# Patient Record
Sex: FEMALE
[De-identification: names, ages, dates, MRNs, and addresses within clinical notes are randomized per-mention and may not be internally consistent; named-entity substitution may affect disease eponyms.]

---

## 2019-10-01 ENCOUNTER — HOSPITAL ENCOUNTER (INPATIENT)
Dept: HOSPITAL 56 - MW.OB | Age: 29
LOS: 2 days | Discharge: HOME | End: 2019-10-03
Attending: OBSTETRICS & GYNECOLOGY | Admitting: OBSTETRICS & GYNECOLOGY
Payer: COMMERCIAL

## 2019-10-01 DIAGNOSIS — Z3A.38: ICD-10-CM

## 2019-10-01 DIAGNOSIS — O41.03X0: Primary | ICD-10-CM

## 2019-10-01 RX ADMIN — MISOPROSTOL PRN MCG: 100 TABLET ORAL at 21:00

## 2019-10-02 PROCEDURE — 3E0P7VZ INTRODUCTION OF HORMONE INTO FEMALE REPRODUCTIVE, VIA NATURAL OR ARTIFICIAL OPENING: ICD-10-PCS | Performed by: OBSTETRICS & GYNECOLOGY

## 2019-10-02 PROCEDURE — 3E0R3BZ INTRODUCTION OF ANESTHETIC AGENT INTO SPINAL CANAL, PERCUTANEOUS APPROACH: ICD-10-PCS | Performed by: OBSTETRICS & GYNECOLOGY

## 2019-10-02 PROCEDURE — 10H07YZ INSERTION OF OTHER DEVICE INTO PRODUCTS OF CONCEPTION, VIA NATURAL OR ARTIFICIAL OPENING: ICD-10-PCS | Performed by: OBSTETRICS & GYNECOLOGY

## 2019-10-02 PROCEDURE — 00HU33Z INSERTION OF INFUSION DEVICE INTO SPINAL CANAL, PERCUTANEOUS APPROACH: ICD-10-PCS | Performed by: OBSTETRICS & GYNECOLOGY

## 2019-10-02 PROCEDURE — 3E033VJ INTRODUCTION OF OTHER HORMONE INTO PERIPHERAL VEIN, PERCUTANEOUS APPROACH: ICD-10-PCS | Performed by: OBSTETRICS & GYNECOLOGY

## 2019-10-02 PROCEDURE — 4A1HXCZ MONITORING OF PRODUCTS OF CONCEPTION, CARDIAC RATE, EXTERNAL APPROACH: ICD-10-PCS | Performed by: OBSTETRICS & GYNECOLOGY

## 2019-10-02 RX ADMIN — MISOPROSTOL PRN MCG: 100 TABLET ORAL at 01:00

## 2019-10-02 NOTE — PCM.PRNOTE
- Free Text/Narrative


Note: 





Requested for Labor Epidural.  , ~3cm in active labor.  pain 7/10. Discussed

, ? answered, wished to proceed.





Position: Sitting


Prep: Chloroprep


Interspace: L3-L4


Skin Local: Lidocaine 1% 5ml


Space: Id'd via JONEL technique with saline/air mixture.  Reconfirmed with saline.


Catheter: 8cm depth without issues.


Test: 5ml 1.5% lidocaine with 1:200K epi added.  -ASP/Test NEGATIVE. 07:05


Bolus @ 07:11-16 with 0.2% Naropin with 100mcg Fentanyl added.  VSS


07:28 Pain 1/10.  07:33 Gtt started 8ml/hr with 4ml q15 bolus.


Patient stable.  No problems noted at present.  Tolerated well.

## 2019-10-02 NOTE — PCM.DEL
L & D Note





- General Info


Date of Service: 10/02/19


Mother's Due Date: 10/15/19





- Delivery Note


Labor: Augmented by ARM, Augmented by Oxytocin


Cervical Ripening Method: Misoprostil


Delivery Outcome: Livebirth


Birth Presentation: Left Occiput Anterior (RADHA)


Nuchal Cord: None


Anesthesia Type: Epidural


Episiotomy Type: None


Laceration: None


Placenta: Intact


Cord: 3 Vessels


APGAR Score 1 min: 8


APGAR Score 5 min: 9


Delivery Comments (Free Text/Narrative):: 





Live male  delivered at 1020am , apgar 8/9 weight pending 





- General Info


Date of Service: 10/02/19





- Patient Data


Weight - Most Recent: 88.904 kg


Lab Results Last 24 Hours: 


 Laboratory Results - last 24 hr











  10/01/19 10/01/19 Range/Units





  16:25 16:25 


 


WBC  6.51   (4.0-11.0)  K/uL


 


RBC  3.91 L   (4.30-5.90)  M/uL


 


Hgb  11.6 L   (12.0-16.0)  g/dL


 


Hct  35.3 L   (36.0-46.0)  %


 


MCV  90.3   (80.0-98.0)  fL


 


MCH  29.7   (27.0-32.0)  pg


 


MCHC  32.9   (31.0-37.0)  g/dL


 


RDW Std Deviation  52.1   (28.0-62.0)  fl


 


RDW Coeff of Fabrizio  16 H   (11.0-15.0)  %


 


Plt Count  197   (150-400)  K/uL


 


MPV  12.20 H   (7.40-12.00)  fL


 


Nucleated RBC %  0.0   /100WBC


 


Nucleated RBCs #  0   K/uL


 


Blood Type   A POSITIVE  


 


Antibody Screen   NEGATIVE  











Med Orders - Current: 


 Current Medications





Butorphanol Tartrate (Stadol)  1 mg IVPUSH Q1H PRN


   PRN Reason: Pain


Carboprost Tromethamine (Hemabate Ds)  250 mcg IM ASDIRECTED PRN


   PRN Reason: Post Partum Hemorrhage


Oxytocin/Sodium Chloride (Oxytocin 30 Unit/500 Ml-Ns)  30 unit in 500 mls @ 2 

mls/hr IV TITRATE DEB; Protocol


   Last Titration: 10/02/19 10:02 Dose:  4 munits/min, 4 mls/hr


Lactated Ringer's (Ringers, Lactated)  1,000 mls @ 150 mls/hr IV ASDIRECTED Formerly [facility]


   Last Admin: 10/02/19 10:05 Dose:  150 mls/hr


Oxytocin/Sodium Chloride (Oxytocin 30 Unit/500 Ml-Ns)  30 unit in 500 mls @ 500 

mls/hr IV TITRATE Formerly [facility]


Tranexamic Acid 1,000 mg/ (Sodium Chloride)  110 mls @ 660 mls/hr IV ONETIME PRN


   PRN Reason: Bleeding


Ampicillin Sodium 1 gm/ Sodium (Chloride)  50 mls @ 100 mls/hr IV Q4H Former[facility]


   Last Admin: 10/02/19 09:11 Dose:  100 mls/hr


Lidocaine HCl (Xylocaine 1%)  50 ml INJECT ONETIME PRN


   PRN Reason: Laceration repair


Methylergonovine Maleate (Methergine)  0.2 mg IM ASDIRECTED PRN


   PRN Reason: Post Partum Hemorrhage


Misoprostol (Cytotec)  25 mcg VAG ONETIME PRN


   PRN Reason: Cervical Ripening


   Last Admin: 10/01/19 16:48 Dose:  25 mcg


Misoprostol (Cytotec)  25 mcg VAG Q4H PRN


   PRN Reason: Cervical Ripening


   Last Admin: 10/02/19 01:00 Dose:  25 mcg


Misoprostol (Cytotec)  200 mcg PO ONETIME PRN


   PRN Reason: Post Partum Hemorrhage


Nalbuphine HCl (Nubain)  10 mg IVPUSH Q1H PRN


   PRN Reason: Pain (severe 7-10)


Ondansetron HCl (Zofran)  4 mg IVPUSH Q4H PRN


   PRN Reason: Nausea/Vomiting


   Last Admin: 10/02/19 08:23 Dose:  4 mg


Sodium Chloride (Saline Flush)  10 ml FLUSH ASDIRECTED PRN


   PRN Reason: Keep Vein Open


Sodium Chloride (Saline Flush)  2.5 ml FLUSH ASDIRECTED PRN


   PRN Reason: Keep Vein Open


Sodium Chloride (Normal Saline)  10 ml IV ASDIRECTED PRN


   PRN Reason: IV Use


Sterile Water (Sterile Water For Irrigation)  1,000 ml IRR ASDIRECTED PRN


   PRN Reason: delivery


Terbutaline Sulfate (Brethine)  0.25 mg SUBCUT ASDIRECTED PRN


   PRN Reason: Tacysystole





Discontinued Medications





Fentanyl (Sublimaze) Confirm Administered Dose 100 mcg .ROUTE .STK-MED ONE


   Stop: 10/02/19 06:52


Ampicillin Sodium 2 gm/ Sodium (Chloride)  100 mls @ 200 mls/hr IV ONETIME ONE


   Stop: 10/01/19 16:59


   Last Admin: 10/01/19 16:39 Dose:  200 mls/hr


Ropivacaine (Naropin 0.2%) Confirm Administered Dose 100 mls @ as directed 

.ROUTE .STK-MED ONE


   Stop: 10/02/19 06:52


Ropivacaine (Naropin 0.2%) Confirm Administered Dose 20 ml .ROUTE .STK-MED ONE


   Stop: 10/02/19 06:52











- Problem List & Annotations


(1) Vaginal delivery


SNOMED Code(s): 237597012


   Code(s): O80 - ENCOUNTER FOR FULL-TERM UNCOMPLICATED DELIVERY   Status: 

Acute   Current Visit: Yes   





- Problem List Review


Problem List Initiated/Reviewed/Updated: Yes

## 2019-10-02 NOTE — PCM.PREANE
Preanesthetic Assessment





- Anesthesia/Transfusion/Family Hx


Anesthesia History: No Prior Anesthesia


Type of Anesthesia Reaction: Allergy


Family History of Anesthesia Reaction: Yes


Transfusion History: No Prior Transfusion(s)


Type of Transfusion Reactions: Reports: Anaphylaxis


Intubation History: Unknown





- Review of Systems


General: No Symptoms


Pulmonary: No Symptoms


Cardiovascular: No Symptoms


Gastrointestinal: No Symptoms


Neurological: No Symptoms


Other: Reports: None





- Physical Assessment


NPO Status Date: 10/02/19


NPO Status Time: 07:49 (clear liquids)


Height: 1.57 m


Weight: 88.904 kg


ASA Class: 2


Mental Status: Alert & Oriented x3


Dentition: Reports: Normal Dentition


ROM/Head Extension: Full


Lungs: Clear to Auscultation


Cardiovascular: Regular Rate





- Lab


Values: 





 Laboratory Last Values











WBC  6.51 K/uL (4.0-11.0)   10/01/19  16:25    


 


RBC  3.91 M/uL (4.30-5.90)  L  10/01/19  16:25    


 


Hgb  11.6 g/dL (12.0-16.0)  L  10/01/19  16:25    


 


Hct  35.3 % (36.0-46.0)  L  10/01/19  16:25    


 


MCV  90.3 fL (80.0-98.0)   10/01/19  16:25    


 


MCH  29.7 pg (27.0-32.0)   10/01/19  16:25    


 


MCHC  32.9 g/dL (31.0-37.0)   10/01/19  16:25    


 


RDW Std Deviation  52.1 fl (28.0-62.0)   10/01/19  16:25    


 


RDW Coeff of Fabrizio  16 % (11.0-15.0)  H  10/01/19  16:25    


 


Plt Count  197 K/uL (150-400)   10/01/19  16:25    


 


MPV  12.20 fL (7.40-12.00)  H  10/01/19  16:25    


 


Nucleated RBC %  0.0 /100WBC  10/01/19  16:25    


 


Nucleated RBCs #  0 K/uL  10/01/19  16:25    


 


Blood Type  A POSITIVE   10/01/19  16:25    


 


Antibody Screen  NEGATIVE   10/01/19  16:25    














- Allergies


Allergies/Adverse Reactions: 


 Allergies











Allergy/AdvReac Type Severity Reaction Status Date / Time


 


No Known Allergies Allergy   Verified 10/01/19 15:57














- Blood


Blood Available: No





- Anesthesia Plan


Pre-Op Medication Ordered: None





- Acknowledgements


Anesthesia Type Planned: Epidural


Pt an Appropriate Candidate for the Planned Anesthesia: Yes


Alternatives and Risks of Anesthesia Discussed w Pt/Guardian: Yes


Pt/Guardian Understands and Agrees with Anesthesia Plan: Yes


Additional Comments: 





Discussed, ? answered, permit signed, acceptable candidate.





PreAnesthesia Questionnaire





- Past Health History


Medical/Surgical History: Denies Medical/Surgical History


OB/GYN History: Reports: Pregnancy





- SUBSTANCE USE


Smoking Status *Q: Never Smoker


Recreational Drug Use History: No





- CURRENT (IN HOUSE) MEDS


Current Meds: 





 Current Medications





Butorphanol Tartrate (Stadol)  1 mg IVPUSH Q1H PRN


   PRN Reason: Pain


Carboprost Tromethamine (Hemabate Ds)  250 mcg IM ASDIRECTED PRN


   PRN Reason: Post Partum Hemorrhage


Oxytocin/Sodium Chloride (Oxytocin 30 Unit/500 Ml-Ns)  30 unit in 500 mls @ 2 

mls/hr IV TITRATE DEB; Protocol


   Last Titration: 10/02/19 06:20 Dose:  4 munits/min, 4 mls/hr


Lactated Ringer's (Ringers, Lactated)  1,000 mls @ 150 mls/hr IV ASDIRECTED DEB


   Last Admin: 10/01/19 16:28 Dose:  150 mls/hr


Oxytocin/Sodium Chloride (Oxytocin 30 Unit/500 Ml-Ns)  30 unit in 500 mls @ 500 

mls/hr IV TITRATE DEB


Tranexamic Acid 1,000 mg/ (Sodium Chloride)  110 mls @ 660 mls/hr IV ONETIME PRN


   PRN Reason: Bleeding


Ampicillin Sodium 1 gm/ Sodium (Chloride)  50 mls @ 100 mls/hr IV Q4H DEB


   Last Admin: 10/02/19 05:19 Dose:  100 mls/hr


Lidocaine HCl (Xylocaine 1%)  50 ml INJECT ONETIME PRN


   PRN Reason: Laceration repair


Methylergonovine Maleate (Methergine)  0.2 mg IM ASDIRECTED PRN


   PRN Reason: Post Partum Hemorrhage


Misoprostol (Cytotec)  25 mcg VAG ONETIME PRN


   PRN Reason: Cervical Ripening


   Last Admin: 10/01/19 16:48 Dose:  25 mcg


Misoprostol (Cytotec)  25 mcg VAG Q4H PRN


   PRN Reason: Cervical Ripening


   Last Admin: 10/02/19 01:00 Dose:  25 mcg


Misoprostol (Cytotec)  200 mcg PO ONETIME PRN


   PRN Reason: Post Partum Hemorrhage


Nalbuphine HCl (Nubain)  10 mg IVPUSH Q1H PRN


   PRN Reason: Pain (severe 7-10)


Ondansetron HCl (Zofran)  4 mg IVPUSH Q4H PRN


   PRN Reason: Nausea/Vomiting


Sodium Chloride (Saline Flush)  10 ml FLUSH ASDIRECTED PRN


   PRN Reason: Keep Vein Open


Sodium Chloride (Saline Flush)  2.5 ml FLUSH ASDIRECTED PRN


   PRN Reason: Keep Vein Open


Sodium Chloride (Normal Saline)  10 ml IV ASDIRECTED PRN


   PRN Reason: IV Use


Sterile Water (Sterile Water For Irrigation)  1,000 ml IRR ASDIRECTED PRN


   PRN Reason: delivery


Terbutaline Sulfate (Brethine)  0.25 mg SUBCUT ASDIRECTED PRN


   PRN Reason: Tacysystole





Discontinued Medications





Fentanyl (Sublimaze) Confirm Administered Dose 100 mcg .ROUTE .STK-MED ONE


   Stop: 10/02/19 06:52


Ampicillin Sodium 2 gm/ Sodium (Chloride)  100 mls @ 200 mls/hr IV ONETIME ONE


   Stop: 10/01/19 16:59


   Last Admin: 10/01/19 16:39 Dose:  200 mls/hr


Ropivacaine (Naropin 0.2%) Confirm Administered Dose 100 mls @ as directed 

.ROUTE .STK-MED ONE


   Stop: 10/02/19 06:52


Ropivacaine (Naropin 0.2%) Confirm Administered Dose 20 ml .ROUTE .STK-MED ONE


   Stop: 10/02/19 06:52

## 2019-10-02 NOTE — OR
SURGEON:

JANAY GAMEZ

 

DATE OF PROCEDURE:10/03/2019

 

PREOPERATIVE DIAGNOSIS:

A 29-year-old G3, P 2-0-0-2, at 38 weeks 1 day undergoing induction of labor for

oligohydramnios.

 

POSTOPERATIVE DIAGNOSIS:

A 29-year-old G3, P 2-0-0-2, at 38 weeks 1 day undergoing induction of labor for

oligohydramnios.

 

PROCEDURE:

Normal spontaneous vaginal delivery.

 

ESTIMATED BLOOD LOSS:

100.

 

IV FLUID:

Pitocin running.

 

ANESTHESIA:

Epidural.

 

COMPLICATIONS:

None.

 

NOTES AND FINDINGS:

A live male  delivered at 10:20 a.m.  Apgar score was 8 and 9.  Weight 
is 2850g

A true note was noted  

 

BRIEF HISTORY:

She is a 29-year-old G3, P 2-0-0-2, at 38 weeks who came in for routine prenatal

care.  She had an ultrasound done and it showed oligohydramnios.  SAUL was 4.  As

a result, the patient was brought in for induction of labor.  Induction of

labor, she received three doses of Cytotec after which the Pitocin was started.

AROM was done.  The patient was noted to have some variable decelerations.  IUPC

was placed.  Amnioinfusion was also given.  Tracing was intermittent between

category 2 and category 1.  She made good progress.  She became fully dilated,

and she was encouraged to push.

 

DESCRIPTION OF PROCEDURE:

With the patient being fully dilated, she was to deliver the head, subsequently

by the anterior and posterior shoulder.  The body of the infant was delivered.

A true cord was noted around the umbilical cord.  The cord was clamped and cut

after a delay  for about 60 seconds.  The cord blood gases were obtained.  
Placenta

was delivered with controlled cord traction.  The cervix was swept, noted to be

intact.  Uterus was firm.  The vaginal bleeding was normal.  Normal lochia

 noted.  All instrument and pad count were correct x2.  Perineum was

inspected and noted to be intact.  The patient was left in Labor and Delivery

room in stable condition.

 

 

TIM MARAVILLA

DD:  10/02/2019 10:40:23

DT:  10/02/2019 14:24:10

Job #:  [id number]

HARRY

## 2019-10-02 NOTE — [id number]
Post Anesthesia Note





- EVALUATION WITHIN 48HRS OF ANESTHETIC


Vital Signs in Normal Range: Yes


Patient Participated in Evaluation: Yes


Respiratory Function Stable: Yes


Airway Patent: Yes


Cardiovascular Function Stable: Yes


Hydration Status Stable: Yes


Pain Control Satisfactory: Yes


Nausea and Vomiting Control Satisfactory: Yes


Mental Status Recovered: Yes


Vital Signs: 





Stable





- COMMENTS/OBSERVATIONS


Free Text/Narrative:: 





Delivered without issues.  Doing well.  No problems noted post.

## 2019-10-03 NOTE — PCM.PNPP
- General Info


Date of Service: 10/03/19


Subjective Update: 





breastfeeding well, minimal lochia.  Tolerating diet, would like to go home 

today.


Functional Status: Reports: Pain Controlled, Tolerating Diet, Ambulating, 

Urinating





- Review of Systems


General: Reports: No Symptoms


HEENT: Reports: No Symptoms


Pulmonary: Reports: No Symptoms


Cardiovascular: Reports: No Symptoms


Gastrointestinal: Reports: No Symptoms


Genitourinary: Reports: No Symptoms


Musculoskeletal: Reports: No Symptoms


Skin: Reports: No Symptoms


Neurological: Reports: No Symptoms


Psychiatric: Reports: No Symptoms





- Patient Data


Vital Signs - Most Recent: 


 Last Vital Signs











Temp  37.0 C   10/03/19 07:00


 


Pulse  74   10/03/19 07:00


 


Resp  16   10/03/19 07:00


 


BP  152/81 H  10/03/19 07:00


 


Pulse Ox  94 L  10/03/19 07:00











Weight - Most Recent: 88.904 kg


Lab Results - Last 24 Hours: 


 Laboratory Results - last 24 hr











  10/03/19 Range/Units





  04:03 


 


Hgb  10.8 L  (12.0-16.0)  g/dL


 


Hct  33.1 L  (36.0-46.0)  %











Med Orders - Current: 


 Current Medications





Acetaminophen (Tylenol Extra Strength)  500 mg PO Q4H PRN


   PRN Reason: Pain


Acetaminophen (Tylenol Extra Strength)  1,000 mg PO Q4H PRN


   PRN Reason: Pain


   Last Admin: 10/02/19 19:56 Dose:  1,000 mg


Benzocaine/Menthol (Dermoplast Pain Relief 20%-0.5% Spray)  78 gm TOP 

ASDIRECTED PRN


   PRN Reason: Perineal Comfort Measure


Bisacodyl (Dulcolax)  10 mg RECTAL ONETIME PRN


   PRN Reason: Constipation


Carboprost Tromethamine (Hemabate Ds)  250 mcg IM ASDIRECTED PRN


   PRN Reason: Post Partum Hemorrhage


Docusate Sodium (Colace)  100 mg PO BID PRN


   PRN Reason: Constipation


Emollient Ointment (Lansinoh Hpa)  0 gm TOP ASDIRECTED PRN


   PRN Reason: Sore Nipples


Tranexamic Acid 1,000 mg/ (Sodium Chloride)  110 mls @ 660 mls/hr IV ONETIME PRN


   PRN Reason: Bleeding


Ibuprofen (Motrin)  400 mg PO Q4H PRN


   PRN Reason: Pain


Ibuprofen (Motrin)  800 mg PO Q6H PRN


   PRN Reason: Pain


   Last Admin: 10/03/19 07:31 Dose:  800 mg


Methylergonovine Maleate (Methergine)  0.2 mg IM ASDIRECTED PRN


   PRN Reason: Post Partum Hemorrhage


Misoprostol (Cytotec)  200 mcg PO ONETIME PRN


   PRN Reason: Post Partum Hemorrhage


Ondansetron HCl (Zofran)  4 mg IVPUSH Q4H PRN


   PRN Reason: Nausea/Vomiting


   Last Admin: 10/02/19 08:23 Dose:  4 mg


Oxycodone HCl (Oxycodone)  5 mg PO Q2H PRN


   PRN Reason: Pain


Witch Hazel (Tucks)  1 pad TOP ASDIRECTED PRN


   PRN Reason: comfort care


   Last Admin: 10/03/19 01:15 Dose:  1 container





Discontinued Medications





Butorphanol Tartrate (Stadol)  1 mg IVPUSH Q1H PRN


   PRN Reason: Pain


Fentanyl (Sublimaze) Confirm Administered Dose 100 mcg .ROUTE .STK-MED ONE


   Stop: 10/02/19 06:52


   Last Admin: 10/03/19 07:24 Dose:  Not Given


Oxytocin/Sodium Chloride (Oxytocin 30 Unit/500 Ml-Ns)  30 unit in 500 mls @ 2 

mls/hr IV TITRATE DEB; Protocol


   Last Titration: 10/02/19 10:02 Dose:  4 munits/min, 4 mls/hr


Ampicillin Sodium 2 gm/ Sodium (Chloride)  100 mls @ 200 mls/hr IV ONETIME ONE


   Stop: 10/01/19 16:59


   Last Admin: 10/01/19 16:39 Dose:  200 mls/hr


Lactated Ringer's (Ringers, Lactated)  1,000 mls @ 150 mls/hr IV ASDIRECTED DEB


   Last Admin: 10/02/19 10:05 Dose:  150 mls/hr


Oxytocin/Sodium Chloride (Oxytocin 30 Unit/500 Ml-Ns)  30 unit in 500 mls @ 500 

mls/hr IV TITRATE DEB


Ampicillin Sodium 1 gm/ Sodium (Chloride)  50 mls @ 100 mls/hr IV Q4H DEB


   Last Admin: 10/02/19 09:11 Dose:  100 mls/hr


Ropivacaine (Naropin 0.2%) Confirm Administered Dose 100 mls @ as directed 

.ROUTE .STK-MED ONE


   Stop: 10/02/19 06:52


   Last Admin: 10/03/19 07:24 Dose:  Not Given


Lidocaine HCl (Xylocaine 1%)  50 ml INJECT ONETIME PRN


   PRN Reason: Laceration repair


Misoprostol (Cytotec)  25 mcg VAG ONETIME PRN


   PRN Reason: Cervical Ripening


   Last Admin: 10/01/19 16:48 Dose:  25 mcg


Misoprostol (Cytotec)  25 mcg VAG Q4H PRN


   PRN Reason: Cervical Ripening


   Last Admin: 10/02/19 01:00 Dose:  25 mcg


Nalbuphine HCl (Nubain)  10 mg IVPUSH Q1H PRN


   PRN Reason: Pain (severe 7-10)


Ropivacaine (Naropin 0.2%) Confirm Administered Dose 20 ml .ROUTE .STK-MED ONE


   Stop: 10/02/19 06:52


   Last Admin: 10/03/19 07:23 Dose:  Not Given


Sodium Chloride (Saline Flush)  10 ml FLUSH ASDIRECTED PRN


   PRN Reason: Keep Vein Open


Sodium Chloride (Saline Flush)  2.5 ml FLUSH ASDIRECTED PRN


   PRN Reason: Keep Vein Open


Sodium Chloride (Normal Saline)  10 ml IV ASDIRECTED PRN


   PRN Reason: IV Use


Sterile Water (Sterile Water For Irrigation)  1,000 ml IRR ASDIRECTED PRN


   PRN Reason: delivery


   Last Admin: 10/02/19 10:17 Dose:  1,000 ml


Terbutaline Sulfate (Brethine)  0.25 mg SUBCUT ASDIRECTED PRN


   PRN Reason: Tacysystole











- Infant Interaction


Infant Disposition, Postpartum: Mason in Room with Family


Infant Interaction: Holding Infant


Infant Feeding:  Infant; Nursed Well


Support Person: 





- Postpartum Recovery Exam


Fundal Tone: Firm


Fundal Level: At Umbilicus


Fundal Placement: Midline


Lochia Amount: Scant


Lochia Color: Rubra/Red


Perineum Description: Intact, Minimal Bruising/Swelling


Episiotomy/Laceration: None


Bladder Status: Voiding


Urinary Elimination: Voided





- Exam


General: Alert, Oriented


HEENT: Pupils Equal


Neck: Supple


Lungs: Normal Respiratory Effort


GI/Abdominal Exam: Soft, No Organomegaly, No Distention


Extremities: Normal Inspection, No Pedal Edema


Skin: Warm, Dry, Intact


Neurological: No New Focal Deficit


Psy/Mental Status: Alert, Normal Affect, Normal Mood





- Problem List & Annotations


(1) Vaginal delivery


SNOMED Code(s): 674969458


   Code(s): O80 - ENCOUNTER FOR FULL-TERM UNCOMPLICATED DELIVERY   Status: 

Acute   Current Visit: Yes   





- Problem List Review


Problem List Initiated/Reviewed/Updated: Yes





- My Orders


Last 24 Hours: 


My Active Orders





10/02/19 Dinner


Regular Diet [DIET] 





10/03/19 08:10


Ready for Discharge [RC] PER UNIT ROUTINE 














- Assessment


Assessment:: 





PPD#1 after , stable minimal lochia, single elevated BP. will recheck if 

normal would like to go home, denies any headaches, swelling or shortness of 

breath





- Plan


Plan:: 


Dismiss to home.  Discharge instructions reviewed.